# Patient Record
Sex: FEMALE | Race: WHITE | Employment: UNEMPLOYED | URBAN - METROPOLITAN AREA
[De-identification: names, ages, dates, MRNs, and addresses within clinical notes are randomized per-mention and may not be internally consistent; named-entity substitution may affect disease eponyms.]

---

## 2022-10-25 ENCOUNTER — HOSPITAL ENCOUNTER (EMERGENCY)
Age: 27
Discharge: HOME | End: 2022-10-26
Attending: EMERGENCY MEDICINE
Payer: OTHER GOVERNMENT

## 2022-10-25 VITALS
WEIGHT: 170 LBS | TEMPERATURE: 97.6 F | HEART RATE: 96 BPM | RESPIRATION RATE: 16 BRPM | DIASTOLIC BLOOD PRESSURE: 85 MMHG | SYSTOLIC BLOOD PRESSURE: 111 MMHG | OXYGEN SATURATION: 100 %

## 2022-10-25 DIAGNOSIS — S09.90XA INJURY OF HEAD, INITIAL ENCOUNTER: ICD-10-CM

## 2022-10-25 DIAGNOSIS — F10.920 ALCOHOLIC INTOXICATION WITHOUT COMPLICATION (HCC): ICD-10-CM

## 2022-10-25 DIAGNOSIS — S01.81XA FACIAL LACERATION, INITIAL ENCOUNTER: Primary | ICD-10-CM
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
## 2022-10-25 PROCEDURE — 99284 EMERGENCY DEPT VISIT MOD MDM: CPT

## 2022-10-26 ENCOUNTER — HOSPITAL ENCOUNTER (EMERGENCY)
Dept: CT IMAGING | Age: 27
Discharge: HOME | End: 2022-10-26
Attending: PHYSICIAN ASSISTANT
Payer: OTHER GOVERNMENT

## 2022-10-26 PROCEDURE — 72125 CT NECK SPINE W/O DYE: CPT

## 2022-10-26 PROCEDURE — 70450 CT HEAD/BRAIN W/O DYE: CPT

## 2022-10-26 NOTE — DISCHARGE INSTRUCTIONS
Keep wound clean, dry, covered for next 24 hours. After 24 hours can wash wound with soap/water, pat dry, cover with bacitracin, and keep covered with a bandage during the day. No soaking. Stitches and/or staples should be removed in 5-7 days. You can go to your primary care doctor to have removed, or return to the ER. If you had a Tdap today, let your primary care provider know for their records. Return to ED if fever, signs of infection (redness, warmth, drainage, swelling), or new concerns.

## 2022-10-26 NOTE — ED TRIAGE NOTES
Pt was drinking tonight ans slipped and fell Down her steps pt noted to have a lac on right eyebrow bleeding controlled in triage.  Pt denies loc vss nad noted

## 2022-10-26 NOTE — ED PROVIDER NOTES
EMERGENCY DEPARTMENT HISTORY & PHYSICAL EXAM    THE [facility] EMERGENCY DEPT  10/26/2022, 11:46 PM    Clinical Impression:  1. Facial laceration, initial encounter    2. Injury of head, initial encounter    3. Fall, initial encounter    4. Alcoholic intoxication without complication (HCC)        Assessment/Differential Diagnosis:     Ddx fall, facial laceration, intracranial injury, neck injury, intoxicated all considered    ED Course:   Initial assessment performed. The patients presenting problems have been discussed, and they are in agreement with the care plan formulated and outlined with them. I have encouraged them to ask questions as they arise throughout their visit. Pt with laceration to right temple area after tripping up stairs. Pt drank sig amount of alcohol tonight. No LOC. No complaints at this time. Exam with laceration to right temple area, no bleeding. No bony defect. Neuro with poor finger following, finger to nose. No point tenderness over neck. Will check CT head and cspine due to intoxication  Declines tylenol    LACERATION NOTE  Tdap UTD, not given  Procedure discussed with pt. Verbal consent was obtained. Questions were answered. Anesthesia- 1% lidocaine with epinephrine, 2ml used for local anesthesia. Pt tolerated well. No complications. Wound was irrigated and cleansed . Would was explored with no FB or deep structures seen. 4 simple interrupted sutures using Prolene 5.0, placed with good wound edge approximation. No active bleeding. No complications. Pt tolerated well. Wound measures 2.5cm in total length. Stitches to be removed in 5-7 days. Wound care was discussed. Medical Chart Review:  I have reviewed triage nursing documentation. Review of old medical records with the following pertinent information:       Disposition:  Home  in good condition.       Chief Complaint   Patient presents with    Laceration     HPI:    The history is provided by patient. No  used. Jean Banegas is a 32 y.o. female presenting to the Emergency Department with complaints of laceration to the right side of her face. Patient states she consumed over 7 alcoholic beverages today. She was walking up her with steps to go to bed when she slipped falling forward striking her head on the edge of the step. There was no loss of consciousness. There was bleeding from her laceration. Patient was brought here for evaluation. She denies headache, vision change, difficulty swallowing or neck pain. No other concern for injury. Ambulating without difficulty. Patient states she had a hysterectomy as a child. She is taking no chronic medication and denies any chronic medical problems      I have reviewed all PMHX, FMHX and Social Hx as entered into the medical record in the chart below using the Epic Template. Review of Systems:  Constitutional: neg for fever, chills  ENT:  neg for URI symptoms. Neg for vision changes. Respiratory:  neg for cough, shortness of breath  Neck: neg for neck pain/stiffness  Cardiovascular:  neg for chest pain  GI:  neg for abdominal pain. Neg for nausea, no vomiting  :  No urinary symptoms. No Flank pain. MSK: neg for back pain. Neg for extremity pain or weakness. No injury. Integumentary: no rash  Neurological: neg for headaches, positive head injury. All other systems reviewed negative with exception of positives in ROS and HPI. Past Medical History:  No past medical history on file. Past Surgical History:  No past surgical history on file. Family History:  No family history on file. Social History: Allergies:  No Known Allergies    Vital Signs:  Vitals:    10/25/22 2204   BP: 111/85   Pulse: 96   Resp: 16   Temp: 97.6 °F (36.4 °C)   SpO2: 100%   Weight: 77.1 kg (170 lb)     Physical Exam:  Vital Signs Reviewed. Nursing Notes Reviewed. Constitutional:  Well developed, well nourished patient. Appearance and behavior are age and situation appropriate. Ambulating with slt ataxic gait, not falling  Head: Normocephalic, scalp with no hematoma or signs of trauma. Face with linear lac about right temple, no bleeding, FB or deep structures seen. No bony defect. Eyes: Visual acuity grossly normal by my exam. Conjunctiva clear, Sclera anicteric. PERRLA. EOMs intact, but pt has difficulty tracking finger. Eyelids normal   ENT:hearing grossly intact, TMs normal. Throat normal. No mouth trauma  Neck:  supple, FROM , nontender  Lungs: No respiratory distress. Lungs CTAB   CV:  RR&R without murmur   Thorax: no chest wall tenderness to palpation. No signs of trauma   Extremities: normal movement of all 4 extremities. No signs of trauma, No pain with palpation. Neuro:  A&O x 3. CN II-XII grossly intact. No gross neuro deficits. No facial asymmetry with testing. Cerebellar tests intact. Strength and sensation intact, bilat equal with testing of UE and LE   Skin:  Warm, dry, no rash. Spine:  No tenderness to palpation over the cervical spine area. No tenderness to palpation over thoracic or lumbar spine. No signs of trauma. No bony defect on my exam. No swelling. Diagnostics:    Labs -   No results found for this or any previous visit (from the past 12 hour(s)). Radiologic Studies -   CT HEAD WO CONT   Final Result      No acute intracranial abnormalities. CT SPINE CERV WO CONT   Final Result      Mild C6-7 disc degenerative change. No fracture or malalignment. CT Results  (Last 48 hours)                 10/26/22 0123  CT HEAD WO CONT Final result    Impression:      No acute intracranial abnormalities. Narrative:  EXAM: CT head       INDICATION: Fall. Intoxication. Trauma. COMPARISON: None.        TECHNIQUE: Axial CT imaging of the head was performed without intravenous   contrast. Dose reduction techniques used: automated exposure control, adjustment   of the mAs and/or kVp according to patient size, and iterative reconstruction   techniques. Digital imaging and communications in Medicine (DICOM) format image   data are available to nonaffiliated external healthcare facilities or entities   on a secure, media free, reciprocally searchable basis with patient   authorization for at least 12 months after this study. _______________       FINDINGS:       BRAIN AND POSTERIOR FOSSA: The sulci, folia, ventricles and basal cisterns are   within normal limits for the patient's age. There is no intracranial hemorrhage,   mass effect, or midline shift. There are no areas of abnormal parenchymal   attenuation. EXTRA-AXIAL SPACES AND MENINGES: There are no abnormal extra-axial fluid   collections. CALVARIUM: Intact. SINUSES: Clear. OTHER: None.       _______________           10/26/22 0123  CT SPINE CERV WO CONT Final result    Impression:      Mild C6-7 disc degenerative change. No fracture or malalignment. Narrative:  EXAM: CT cervical spine       INDICATION: Pain. COMPARISON: None. TECHNIQUE: Axial CT imaging of the cervical spine was performed from the skull   base to the thoracic inlet without intravenous contrast. Multiplanar reformats   were generated. Dose reduction techniques used: automated exposure control,   adjustment of the mAs and/or kVp according to patient size, and iterative   reconstruction techniques. Digital imaging and communications in Medicine   (DICOM) format image data are available to nonaffiliated external healthcare   facilities or entities on a secure, media free, reciprocally searchable basis   with patient authorization for at least 12 months after this study. _______________       FINDINGS:       VERTEBRAE AND DISCS: Vertebral alignment and articulation are within normal   limits.  There is mild C6-7 disc degenerative change with mild osteophyte   formation without significant spinal canal and neuroforaminal narrowing. The   remaining disc spaces are within normal limits. There is no significant spinal   canal and       SPINAL CANAL AND FORAMINA: Patent without significant bony canal or foramina   encroachment. PREVERTEBRAL SOFT TISSUES: Normal.       VISIBLE PORTIONS OF POSTERIOR FOSSA/BRAIN: Normal.       LUNG APICES: Clear. OTHER: None.       _______________                 CXR Results  (Last 48 hours)      None            Medications given in the ED-  Medications - No data to display    Please note that this dictation was completed with [employer], the computer voice recognition software. Quite often unanticipated grammatical, syntax, homophones, and other interpretive errors are inadvertently transcribed by the computer software. Please disregard these errors. Please excuse any errors that have escaped final proofreading.

## 2022-10-31 ENCOUNTER — HOSPITAL ENCOUNTER (EMERGENCY)
Age: 27
Discharge: HOME | End: 2022-10-31
Attending: EMERGENCY MEDICINE | Admitting: EMERGENCY MEDICINE
Payer: OTHER GOVERNMENT

## 2022-10-31 VITALS
RESPIRATION RATE: 16 BRPM | DIASTOLIC BLOOD PRESSURE: 75 MMHG | OXYGEN SATURATION: 98 % | HEART RATE: 73 BPM | SYSTOLIC BLOOD PRESSURE: 124 MMHG | HEIGHT: 62 IN | BODY MASS INDEX: 29.44 KG/M2 | TEMPERATURE: 98 F | WEIGHT: 160 LBS

## 2022-10-31 DIAGNOSIS — Z48.02 VISIT FOR SUTURE REMOVAL: Primary | ICD-10-CM

## 2022-10-31 PROCEDURE — 75810000275 HC EMERGENCY DEPT VISIT NO LEVEL OF CARE: Performed by: EMERGENCY MEDICINE

## 2022-10-31 NOTE — ED NOTES
I have reviewed discharge instructions with the patient. The patient verbalized understanding.  D/c by pa

## 2022-10-31 NOTE — ED TRIAGE NOTES
Patient reports she had sutures placed last Tuesday and needs them removed from her face.  5 sutures in triage to right side of [facility]

## 2022-10-31 NOTE — ED PROVIDER NOTES
EMERGENCY DEPARTMENT HISTORY AND PHYSICAL EXAM    Date: 10/31/2022  Patient Name: Cl Hargrove    History of Presenting Illness     Chief Complaint   Patient presents with    Suture Removal         History Provided By: Patient    Chief Complaint: suture removal    HPI(Context):   1:16 PM  Cl Hargrove is a 32 y.o. female who presents to the emergency department for suture removal. Pt had simple interrupted sutures placed to right side of face at this facility on 10/25/2022. Pt notes no increased pain or wound drainage. Pt has no other sxs or complaints. PCP: Other, MD Joey        Past History     Past Medical History:  History reviewed. No pertinent past medical history. Past Surgical History:  History reviewed. No pertinent surgical history. Family History:  History reviewed. No pertinent family history. Social History:  Social History     Tobacco Use    Smoking status: Never   Substance Use Topics    Alcohol use: Yes     Comment: social    Drug use: Never       Allergies: Allergies   Allergen Reactions    Latex Hives    Succinylcholine Unknown (comments)         Review of Systems   Review of Systems   Constitutional:  Negative for fever. HENT:  Negative for facial swelling. Skin:  Positive for wound. Negative for color change. Physical Exam     Vitals:    10/31/22 1219   BP: 124/75   Pulse: 73   Resp: 16   Temp: 98 °F (36.7 °C)   SpO2: 98%   Weight: 72.6 kg (160 lb)   Height: 5' 2\" (1.575 m)     Physical Exam  Vitals and nursing note reviewed. Constitutional:       General: She is not in acute distress. Appearance: She is well-developed. She is not diaphoretic. Comments:  female in NAD. Alert. Appears comfortable. In RW   HENT:      Head: Normocephalic and atraumatic. Comments: Sutured wound to right side of face with 5 simple interrupted sutures in place.  No drainage or erythema     Right Ear: External ear normal.      Left Ear: External ear normal.      Nose: Nose normal.   Eyes:      General: No scleral icterus. Right eye: No discharge. Left eye: No discharge. Conjunctiva/sclera: Conjunctivae normal.   Cardiovascular:      Rate and Rhythm: Normal rate and regular rhythm. Pulmonary:      Effort: Pulmonary effort is normal. No tachypnea or accessory muscle usage. Breath sounds: No decreased breath sounds. Musculoskeletal:         General: Normal range of motion. Cervical back: Normal range of motion. Skin:     General: Skin is warm and dry. Neurological:      Mental Status: She is alert and oriented to person, place, and time. Psychiatric:         Judgment: Judgment normal.           Diagnostic Study Results     Labs -   No results found for this or any previous visit (from the past 12 hour(s)). No orders to display     CT Results  (Last 48 hours)      None          CXR Results  (Last 48 hours)      None            Medications given in the ED-  Medications - No data to display      Medical Decision Making   I am the first provider for this patient. I reviewed the vital signs, available nursing notes, past medical history, past surgical history, family history and social history. Vital Signs-Reviewed the patient's vital signs. Pulse Oximetry Analysis - 98% on RA.  NORMAL     Records Reviewed: Nursing Notes    Provider Notes (Medical Decision Making): suture removal    Procedures:  Suture/Staple Removal    Date/Time: 10/31/2022 1:32 PM  Performed by: Seble Tapia PA-C  Authorized by: Seble Tapia PA-C     Consent:     Consent obtained:  Verbal    Consent given by:  Patient    Risks discussed:  Pain  Universal protocol:     Patient identity confirmed:  Arm band  Location:     Location:  Head/neck  Procedure details:     Wound appearance:  No signs of infection    Number of sutures removed:  5  Post-procedure details:     Post-removal:  Steri-Strips applied (1 steri-strip placed)    Procedure completion: Tolerated    ED Course:   1:16 PM Initial assessment performed. The patients presenting problems have been discussed, and they are in agreement with the care plan formulated and outlined with them. I have encouraged them to ask questions as they arise throughout their visit. Diagnosis and Disposition       Successful suture removal. Healing fine. Subcentimeter area of small dehiscence. Steri strip applied. Reasons to RTED discussed with pt. All questions answered. Pt feels comfortable going home at this time. Pt expressed understanding and she agrees with plan. 1. Visit for suture removal        PLAN:  1. D/C Home  2. There are no discharge medications for this patient. 3.   Follow-up Information       Follow up With Specialties Details Why [address]    THE [facility] EMERGENCY DEPT Emergency Medicine  As needed, If symptoms worsen [address] [id number]  [phone]          _______________________________    Attestations: This note is prepared by Yasmany Valenzuela PA-C.  _______________________________      Please note that this dictation was completed with [employer], the computer voice recognition software. Quite often unanticipated grammatical, syntax, homophones, and other interpretive errors are inadvertently transcribed by the computer software. Please disregard these errors. Please excuse any errors that have escaped final proofreading.